# Patient Record
Sex: MALE | Race: WHITE | Employment: OTHER | URBAN - METROPOLITAN AREA
[De-identification: names, ages, dates, MRNs, and addresses within clinical notes are randomized per-mention and may not be internally consistent; named-entity substitution may affect disease eponyms.]

---

## 2017-02-02 ENCOUNTER — OFFICE VISIT (OUTPATIENT)
Dept: INTERNAL MEDICINE CLINIC | Age: 62
End: 2017-02-02

## 2017-02-02 VITALS
SYSTOLIC BLOOD PRESSURE: 140 MMHG | DIASTOLIC BLOOD PRESSURE: 82 MMHG | WEIGHT: 217 LBS | HEIGHT: 68 IN | BODY MASS INDEX: 32.89 KG/M2 | OXYGEN SATURATION: 99 % | TEMPERATURE: 98.1 F | HEART RATE: 81 BPM

## 2017-02-02 DIAGNOSIS — Z23 NEED FOR VACCINATION WITH 13-POLYVALENT PNEUMOCOCCAL CONJUGATE VACCINE: ICD-10-CM

## 2017-02-02 DIAGNOSIS — I10 ESSENTIAL HYPERTENSION: ICD-10-CM

## 2017-02-02 DIAGNOSIS — E11.9 DIABETES MELLITUS WITHOUT COMPLICATION (HCC): ICD-10-CM

## 2017-02-02 DIAGNOSIS — Z23 NEED FOR ZOSTAVAX ADMINISTRATION: ICD-10-CM

## 2017-02-02 DIAGNOSIS — Z23 NEED FOR TDAP VACCINATION: ICD-10-CM

## 2017-02-02 DIAGNOSIS — M25.512 ACUTE PAIN OF LEFT SHOULDER: ICD-10-CM

## 2017-02-02 DIAGNOSIS — F32.9 REACTIVE DEPRESSION: ICD-10-CM

## 2017-02-02 DIAGNOSIS — E78.00 HYPERCHOLESTEROLEMIA: ICD-10-CM

## 2017-02-02 DIAGNOSIS — Z00.00 WELL ADULT EXAM: Primary | ICD-10-CM

## 2017-02-02 RX ORDER — ATORVASTATIN CALCIUM 40 MG/1
TABLET, FILM COATED ORAL
Qty: 90 TAB | Refills: 1 | Status: SHIPPED | OUTPATIENT
Start: 2017-02-02 | End: 2017-02-07 | Stop reason: SDUPTHER

## 2017-02-02 NOTE — PROGRESS NOTES
HISTORY OF PRESENT ILLNESS  Bert Fontana is a 64 y.o. male. HPI  Here for a pe. He has controlled htn since adding lantus. He is on an arb for htn . He is on a statin for hld. He is doing well with depression. He needs vaccines. His c-scope is current. He is exercising at the gym but hurt his left shoulder. He is a stay at home  with wife working at [employer]. Past Medical History   Diagnosis Date    ACE-inhibitor cough     Depression     Diabetes (HCC)     HTN (hypertension)     Hypercholesterolemia     OCD (obsessive compulsive disorder)      Current Outpatient Prescriptions   Medication Sig    losartan (COZAAR) 100 mg tablet TAKE ONE TABLET BY MOUTH ONCE DAILY    glimepiride (AMARYL) 4 mg tablet TAKE ONE TABLET BY MOUTH IN THE MORNING    metFORMIN ER (GLUCOPHAGE XR) 500 mg tablet TAKE FOUR TABLETS BY MOUTH ONCE DAILY WITH DINNER    citalopram (CELEXA) 40 mg tablet TAKE ONE TABLET BY MOUTH ONCE DAILY    atorvastatin (LIPITOR) 20 mg tablet TAKE ONE TABLET BY MOUTH IN THE EVENING    insulin glargine (LANTUS SOLOSTAR) 100 unit/mL (3 mL) pen 40-60 units daily    Insulin Needles, Disposable, 31 gauge x 5/16\" ndle by SubCUTAneous route daily.  aspirin 81 mg chewable tablet Take 81 mg by mouth daily. No current facility-administered medications for this visit. Review of Systems   Constitutional: Negative for fever, malaise/fatigue and weight loss. Eyes: Negative for blurred vision. Respiratory: Negative for cough and shortness of breath. Cardiovascular: Negative for chest pain and leg swelling. Gastrointestinal: Negative for abdominal pain, blood in stool, constipation and diarrhea. Genitourinary: Negative for frequency and hematuria. Musculoskeletal: Negative for back pain and joint pain. Skin: Negative for rash. Neurological: Negative for dizziness, sensory change, focal weakness and headaches. Psychiatric/Behavioral: Negative for depression and memory loss. The patient is not nervous/anxious and does not have insomnia. not sexually active due to wife's dyspareunia    Visit Vitals    /82 (BP 1 Location: Right arm, BP Patient Position: Sitting)    Pulse 81    Temp 98.1 °F (36.7 °C)    Ht 5' 8.25\" (1.734 m)    Wt 217 lb (98.4 kg)    SpO2 99%    BMI 32.75 kg/m2       Physical Exam   Constitutional: He is oriented to person, place, and time. He appears well-developed and well-nourished. HENT:   Head: Normocephalic and atraumatic. Right Ear: External ear normal.   Left Ear: External ear normal.   Mouth/Throat: Oropharynx is clear and moist.   Eyes: Conjunctivae and EOM are normal. Pupils are equal, round, and reactive to light. Neck: Normal range of motion. Neck supple. No JVD present. No thyromegaly present. Cardiovascular: Normal rate, regular rhythm and normal heart sounds. Exam reveals no gallop and no friction rub. No murmur heard. Pulmonary/Chest: Effort normal and breath sounds normal. He has no wheezes. He has no rales. Abdominal: Soft. Bowel sounds are normal. He exhibits no mass. There is no tenderness. There is no guarding. Genitourinary: Rectum normal, prostate normal and penis normal.   Musculoskeletal: Normal range of motion. He exhibits no edema. Lymphadenopathy:     He has no cervical adenopathy. Neurological: He is alert and oriented to person, place, and time. He has normal strength and normal reflexes. No cranial nerve deficit or sensory deficit. Skin: Skin is warm and dry. No rash noted. Psychiatric: He has a normal mood and affect. His behavior is normal. Thought content normal.   Nursing note and vitals reviewed.     Lab Results   Component Value Date/Time    Hemoglobin A1c 6.9 10/21/2016 09:47 AM     Lab Results   Component Value Date/Time    Cholesterol, total 181 10/21/2016 09:47 AM    HDL Cholesterol 34 10/21/2016 09:47 AM    LDL, calculated 110 10/21/2016 09:47 AM    VLDL, calculated 37 10/21/2016 09:47 AM Triglyceride 186 10/21/2016 09:47 AM     Lab Results   Component Value Date/Time    Prostate Specific Ag 1.9 02/15/2016 10:41 AM    Prostate Specific Ag 1.9 07/21/2014 03:51 PM       ASSESSMENT and PLAN  Ray Radha was seen today for physical.    Diagnoses and all orders for this visit:    Well adult exam  -     PSA SCREENING (SCREENING) ()  -     CBC WITH AUTOMATED DIFF    Diabetes mellitus without complication (HCC)  -     HEMOGLOBIN A1C WITH EAG  -     MICROALBUMIN, UR, RAND W/ MICROALBUMIN/CREA RATIO  The current medical regimen is effective;  continue present plan and medications. Essential hypertension  -     METABOLIC PANEL, COMPREHENSIVE  The current medical regimen is effective;  continue present plan and medications. Hypercholesterolemia  -     LIPID PANEL  -   increase  atorvastatin (LIPITOR) 40 mg tablet; TAKE ONE TABLET BY MOUTH IN THE EVENING    Reactive depression  The current medical regimen is effective;  continue present plan and medications. Acute pain of left shoulder  -     REFERRAL TO ORTHOPEDICS    Need for Tdap vaccination  Rx given.     Need for vaccination with 13-polyvalent pneumococcal conjugate vaccine    Need for Zostavax administration    Reviewed plan of care with the patient who has provided input and agrees with the goals

## 2017-02-02 NOTE — MR AVS SNAPSHOT
Visit Information Date & Time Provider Department Dept. Phone Encounter #  
 2/2/2017  8:30 AM Tacos Hood MD [facility] [phone] [id number] Upcoming Health Maintenance Date Due Hepatitis C Screening 1955 EYE EXAM RETINAL OR DILATED Q1 10/18/1965 Pneumococcal 19-64 Medium Risk (1 of 1 - PPSV23) 10/18/1974 DTaP/Tdap/Td series (1 - Tdap) 10/18/1976 ZOSTER VACCINE AGE 60> 10/18/2015 FOOT EXAM Q1 2/15/2017 MICROALBUMIN Q1 2/15/2017 COLONOSCOPY 1/1/2019* HEMOGLOBIN A1C Q6M 4/21/2017 LIPID PANEL Q1 10/21/2017 *Topic was postponed. The date shown is not the original due date. Allergies as of 2/2/2017  Review Complete On: 2/2/2017 By: Harvinder Redd No Known Allergies Current Immunizations  Reviewed on 10/21/2016 Name Date Influenza Vaccine 10/21/2014  9:09 AM  
 Influenza Vaccine (Quad) 11/3/2015 Influenza Vaccine (Quad) PF 10/21/2016 Not reviewed this visit You Were Diagnosed With   
  
 Codes Comments Well adult exam    -  Primary ICD-10-CM: Z00.00 ICD-9-CM: V70.0 Diabetes mellitus without complication ([facility].)     SPZ-08-VZ: E11.9 ICD-9-CM: 250.00 Essential hypertension     ICD-10-CM: I10 
ICD-9-CM: 401.9 Hypercholesterolemia     ICD-10-CM: E78.00 ICD-9-CM: 272.0 Reactive depression     ICD-10-CM: F32.9 ICD-9-CM: 300.4 Acute pain of left shoulder     ICD-10-CM: M25.512 ICD-9-CM: 719.41 Need for Tdap vaccination     ICD-10-CM: M49 ICD-9-CM: V06.1 Need for vaccination with 13-polyvalent pneumococcal conjugate vaccine     ICD-10-CM: R94 ICD-9-CM: V03.82 Need for Zostavax administration     ICD-10-CM: Z89 ICD-9-CM: V04.89 Vitals BP Pulse Temp Height(growth percentile) Weight(growth percentile) SpO2  
 150/82 (BP 1 Location: Right arm, BP Patient Position: Sitting) 81 98.1 °F (36.7 °C) 5' 8.25\" (1.734 m) 217 lb (98.4 kg) 99% BMI Smoking Status 32.75 kg/m2 Never Smoker BMI and BSA Data Body Mass Index Body Surface Area 32.75 kg/m 2 2.18 m 2 Preferred Pharmacy Pharmacy Name Phone [facility] [address] [place] [address] [phone] Your Updated Medication List  
  
   
This list is accurate as of: 2/2/17  8:51 AM.  Always use your most recent med list.  
  
  
  
  
 aspirin 81 mg chewable tablet Take 81 mg by mouth daily. atorvastatin 40 mg tablet Commonly known as:  LIPITOR  
TAKE ONE TABLET BY MOUTH IN THE EVENING  
  
 citalopram 40 mg tablet Commonly known as:  CELEXA  
TAKE ONE TABLET BY MOUTH ONCE DAILY  
  
 glimepiride 4 mg tablet Commonly known as:  AMARYL  
TAKE ONE TABLET BY MOUTH IN THE MORNING  
  
 insulin glargine 100 unit/mL (3 mL) pen Commonly known as:  LANTUS SOLOSTAR  
40-60 units daily Insulin Needles (Disposable) 31 gauge x 5/16\" Ndle  
by SubCUTAneous route daily. losartan 100 mg tablet Commonly known as:  COZAAR  
TAKE ONE TABLET BY MOUTH ONCE DAILY  
  
 metFORMIN  mg tablet Commonly known as:  GLUCOPHAGE XR  
TAKE FOUR TABLETS BY MOUTH ONCE DAILY WITH DINNER Prescriptions Sent to Pharmacy Refills  
 atorvastatin (LIPITOR) 40 mg tablet 1 Sig: TAKE ONE TABLET BY MOUTH IN THE EVENING Class: Normal  
 Pharmacy: [address] Ph #: [phone] We Performed the Following CBC WITH AUTOMATED DIFF [66843 CPT(R)] HEMOGLOBIN A1C WITH EAG [67551 CPT(R)] LIPID PANEL [87928 CPT(R)] METABOLIC PANEL, COMPREHENSIVE [78961 CPT(R)] MICROALBUMIN, UR, RAND W/ MICROALBUMIN/CREA RATIO [id number] CPT(R)] PSA SCREENING (SCREENING) [ [facility]] REFERRAL TO ORTHOPEDICS [NES950 Custom] Comments:  
 Please evaluate patient for left shoulder. Referral Information Referral ID Referred By Referred To [id number] [place], [address] [address] Phone: [phone] Fax: [phone] Visits Status Start Date End Date 1 New Request 2/2/17 2/2/18 If your referral has a status of pending review or denied, additional information will be sent to support the outcome of this decision. Introducing [facility]! Dear Arabella Sesay: Thank you for requesting a [employer] account. Our records indicate that you already have an active [employer] account. You can access your account anytime at [email or web address] Did you know that you can access your hospital and ER discharge instructions at any time in [employer]? You can also review all of your test results from your hospital stay or ER visit. Additional Information If you have questions, please visit the Frequently Asked Questions section of the [employer] website at [email or web address] Remember, [employer] is NOT to be used for urgent needs. For medical emergencies, dial 911. Now available from your iPhone and Android! Please provide this summary of care documentation to your next provider. Your primary care clinician is listed as [id number] [address] [place] [address] [address]. If you have any questions after today's visit, please call [phone].

## 2017-02-04 LAB
ALBUMIN SERPL-MCNC: 4.5 G/DL (ref 3.6–4.8)
ALBUMIN/CREAT UR: 129.3 MG/G CREAT (ref 0–30)
ALBUMIN/GLOB SERPL: 1.7 {RATIO} (ref 1.1–2.5)
ALP SERPL-CCNC: 82 IU/L (ref 39–117)
ALT SERPL-CCNC: 19 IU/L (ref 0–44)
AST SERPL-CCNC: 14 IU/L (ref 0–40)
BASOPHILS # BLD AUTO: 0 X10E3/UL (ref 0–0.2)
BASOPHILS NFR BLD AUTO: 0 %
BILIRUB SERPL-MCNC: 0.5 MG/DL (ref 0–1.2)
BUN SERPL-MCNC: 9 MG/DL (ref 8–27)
BUN/CREAT SERPL: 13 (ref 10–22)
CALCIUM SERPL-MCNC: 8.8 MG/DL (ref 8.6–10.2)
CHLORIDE SERPL-SCNC: 98 MMOL/L (ref 96–106)
CHOLEST SERPL-MCNC: 141 MG/DL (ref 100–199)
CO2 SERPL-SCNC: 27 MMOL/L (ref 18–29)
CREAT SERPL-MCNC: 0.69 MG/DL (ref 0.76–1.27)
CREAT UR-MCNC: 101 MG/DL
EOSINOPHIL # BLD AUTO: 0.2 X10E3/UL (ref 0–0.4)
EOSINOPHIL NFR BLD AUTO: 1 %
ERYTHROCYTE [DISTWIDTH] IN BLOOD BY AUTOMATED COUNT: 14.4 % (ref 12.3–15.4)
EST. AVERAGE GLUCOSE BLD GHB EST-MCNC: 169 MG/DL
GLOBULIN SER CALC-MCNC: 2.6 G/DL (ref 1.5–4.5)
GLUCOSE SERPL-MCNC: 155 MG/DL (ref 65–99)
HBA1C MFR BLD: 7.5 % (ref 4.8–5.6)
HCT VFR BLD AUTO: 41.1 % (ref 37.5–51)
HDLC SERPL-MCNC: 32 MG/DL
HGB BLD-MCNC: 13.8 G/DL (ref 12.6–17.7)
IMM GRANULOCYTES # BLD: 0 X10E3/UL (ref 0–0.1)
IMM GRANULOCYTES NFR BLD: 0 %
INTERPRETATION, 910389: NORMAL
LDLC SERPL CALC-MCNC: 49 MG/DL (ref 0–99)
LYMPHOCYTES # BLD AUTO: 3 X10E3/UL (ref 0.7–3.1)
LYMPHOCYTES NFR BLD AUTO: 21 %
Lab: NORMAL
MCH RBC QN AUTO: 26.5 PG (ref 26.6–33)
MCHC RBC AUTO-ENTMCNC: 33.6 G/DL (ref 31.5–35.7)
MCV RBC AUTO: 79 FL (ref 79–97)
MICROALBUMIN UR-MCNC: 130.6 UG/ML
MONOCYTES # BLD AUTO: 1.2 X10E3/UL (ref 0.1–0.9)
MONOCYTES NFR BLD AUTO: 8 %
NEUTROPHILS # BLD AUTO: 10.3 X10E3/UL (ref 1.4–7)
NEUTROPHILS NFR BLD AUTO: 70 %
PLATELET # BLD AUTO: 216 X10E3/UL (ref 150–379)
POTASSIUM SERPL-SCNC: 4.3 MMOL/L (ref 3.5–5.2)
PROT SERPL-MCNC: 7.1 G/DL (ref 6–8.5)
PSA SERPL-MCNC: 1.7 NG/ML (ref 0–4)
RBC # BLD AUTO: 5.21 X10E6/UL (ref 4.14–5.8)
SODIUM SERPL-SCNC: 140 MMOL/L (ref 134–144)
TRIGL SERPL-MCNC: 302 MG/DL (ref 0–149)
VLDLC SERPL CALC-MCNC: 60 MG/DL (ref 5–40)
WBC # BLD AUTO: 14.8 X10E3/UL (ref 3.4–10.8)

## 2017-02-07 DIAGNOSIS — E78.00 HYPERCHOLESTEROLEMIA: ICD-10-CM

## 2017-02-07 RX ORDER — ATORVASTATIN CALCIUM 40 MG/1
TABLET, FILM COATED ORAL
Qty: 90 TAB | Refills: 1 | Status: SHIPPED | OUTPATIENT
Start: 2017-02-07 | End: 2017-09-16 | Stop reason: SDUPTHER

## 2017-05-31 ENCOUNTER — OFFICE VISIT (OUTPATIENT)
Dept: INTERNAL MEDICINE CLINIC | Age: 62
End: 2017-05-31

## 2017-05-31 VITALS
RESPIRATION RATE: 18 BRPM | HEART RATE: 67 BPM | OXYGEN SATURATION: 96 % | SYSTOLIC BLOOD PRESSURE: 140 MMHG | HEIGHT: 68 IN | DIASTOLIC BLOOD PRESSURE: 80 MMHG | BODY MASS INDEX: 32.52 KG/M2 | WEIGHT: 214.6 LBS | TEMPERATURE: 97.9 F

## 2017-05-31 DIAGNOSIS — E11.9 DIABETES MELLITUS WITHOUT COMPLICATION (HCC): Primary | ICD-10-CM

## 2017-05-31 DIAGNOSIS — I10 ESSENTIAL HYPERTENSION: ICD-10-CM

## 2017-05-31 DIAGNOSIS — E78.00 HYPERCHOLESTEROLEMIA: ICD-10-CM

## 2017-05-31 DIAGNOSIS — J01.00 SUBACUTE MAXILLARY SINUSITIS: ICD-10-CM

## 2017-05-31 RX ORDER — CEFUROXIME AXETIL 500 MG/1
500 TABLET ORAL 2 TIMES DAILY
Qty: 20 TAB | Refills: 0 | Status: SHIPPED | OUTPATIENT
Start: 2017-05-31 | End: 2017-08-31 | Stop reason: ALTCHOICE

## 2017-05-31 NOTE — PROGRESS NOTES
HISTORY OF PRESENT ILLNESS  Robert Wood is a 64 y.o. male. HPI  Here for dm. He is not counting carbs and readings are \"all over the place\". He is using lantus with oral agents. He has controlled htn and is on a statin for hld. He has had a sinus infection for one month. Past Medical History:   Diagnosis Date    ACE-inhibitor cough     Depression     Diabetes (HCC)     HTN (hypertension)     Hypercholesterolemia     OCD (obsessive compulsive disorder)      Current Outpatient Prescriptions   Medication Sig    cefUROXime (CEFTIN) 500 mg tablet Take 1 Tab by mouth two (2) times a day.  losartan (COZAAR) 100 mg tablet TAKE ONE TABLET BY MOUTH ONCE DAILY    atorvastatin (LIPITOR) 40 mg tablet TAKE ONE TABLET BY MOUTH IN THE EVENING    citalopram (CELEXA) 40 mg tablet TAKE ONE TABLET BY MOUTH ONCE DAILY    glimepiride (AMARYL) 4 mg tablet TAKE ONE TABLET BY MOUTH IN THE MORNING    metFORMIN ER (GLUCOPHAGE XR) 500 mg tablet TAKE FOUR TABLETS BY MOUTH ONCE DAILY WITH DINNER    insulin glargine (LANTUS SOLOSTAR) 100 unit/mL (3 mL) pen 40-60 units daily    Insulin Needles, Disposable, 31 gauge x 5/16\" ndle by SubCUTAneous route daily.  aspirin 81 mg chewable tablet Take 81 mg by mouth daily. No current facility-administered medications for this visit. Review of Systems   All other systems reviewed and are negative. Visit Vitals    /80 (BP 1 Location: Left arm, BP Patient Position: At rest)    Pulse 67    Temp 97.9 °F (36.6 °C)    Resp 18    Ht 5' 8\" (1.727 m)    Wt 214 lb 9.6 oz (97.3 kg)    SpO2 96%    BMI 32.63 kg/m2       Physical Exam   Constitutional: He appears well-developed and well-nourished. Cardiovascular: Normal rate, regular rhythm and normal heart sounds. Pulmonary/Chest: Effort normal and breath sounds normal. No respiratory distress. He has no wheezes. He has no rales. Nursing note and vitals reviewed.     Lab Results   Component Value Date/Time Hemoglobin A1c 7.5 02/03/2017 09:19 AM       ASSESSMENT and PLAN  Emily Ortega was seen today for hypertension, diabetes and cholesterol problem. Diagnoses and all orders for this visit:    Diabetes mellitus without complication ([facility]  -     HEMOGLOBIN A1C WITH EAG  -     CBC WITH AUTOMATED DIFF  Need to carb count sticking to 60 grams per meal.  May need to increase insulin if FBG elevated. Essential hypertension  -     METABOLIC PANEL, COMPREHENSIVE  The current medical regimen is effective;  continue present plan and medications. Hypercholesterolemia  -     LIPID PANEL  The current medical regimen is effective;  continue present plan and medications. Subacute maxillary sinusitis  -     cefUROXime (CEFTIN) 500 mg tablet; Take 1 Tab by mouth two (2) times a day.       Reviewed plan of care with the patient who has provided input and agrees with the goals

## 2017-05-31 NOTE — PROGRESS NOTES
1. Have you been to the ER, urgent care clinic since your last visit? Hospitalized since your last visit?no    2. Have you seen or consulted any other health care providers outside of the [address] [place] [address] since your last visit? Include any pap smears or colon screening.  No    Chief Complaint   Patient presents with    Hypertension     3 months follow up    Diabetes     3months follow up    Cholesterol Problem     3 months follow up     Fasting

## 2017-06-01 LAB
ALBUMIN SERPL-MCNC: 4.4 G/DL (ref 3.6–4.8)
ALBUMIN/GLOB SERPL: 1.8 {RATIO} (ref 1.2–2.2)
ALP SERPL-CCNC: 82 IU/L (ref 39–117)
ALT SERPL-CCNC: 17 IU/L (ref 0–44)
AST SERPL-CCNC: 13 IU/L (ref 0–40)
BASOPHILS # BLD AUTO: 0.1 X10E3/UL (ref 0–0.2)
BASOPHILS NFR BLD AUTO: 0 %
BILIRUB SERPL-MCNC: 0.7 MG/DL (ref 0–1.2)
BUN SERPL-MCNC: 11 MG/DL (ref 8–27)
BUN/CREAT SERPL: 15 (ref 10–24)
CALCIUM SERPL-MCNC: 8.9 MG/DL (ref 8.6–10.2)
CHLORIDE SERPL-SCNC: 98 MMOL/L (ref 96–106)
CHOLEST SERPL-MCNC: 143 MG/DL (ref 100–199)
CO2 SERPL-SCNC: 22 MMOL/L (ref 18–29)
CREAT SERPL-MCNC: 0.72 MG/DL (ref 0.76–1.27)
EOSINOPHIL # BLD AUTO: 0.2 X10E3/UL (ref 0–0.4)
EOSINOPHIL NFR BLD AUTO: 2 %
ERYTHROCYTE [DISTWIDTH] IN BLOOD BY AUTOMATED COUNT: 13.4 % (ref 12.3–15.4)
EST. AVERAGE GLUCOSE BLD GHB EST-MCNC: 243 MG/DL
GLOBULIN SER CALC-MCNC: 2.5 G/DL (ref 1.5–4.5)
GLUCOSE SERPL-MCNC: 219 MG/DL (ref 65–99)
HBA1C MFR BLD: 10.1 % (ref 4.8–5.6)
HCT VFR BLD AUTO: 41.3 % (ref 37.5–51)
HDLC SERPL-MCNC: 32 MG/DL
HGB BLD-MCNC: 13.6 G/DL (ref 12.6–17.7)
IMM GRANULOCYTES # BLD: 0 X10E3/UL (ref 0–0.1)
IMM GRANULOCYTES NFR BLD: 0 %
INTERPRETATION, 910389: NORMAL
LDLC SERPL CALC-MCNC: 68 MG/DL (ref 0–99)
LYMPHOCYTES # BLD AUTO: 3.3 X10E3/UL (ref 0.7–3.1)
LYMPHOCYTES NFR BLD AUTO: 29 %
Lab: NORMAL
MCH RBC QN AUTO: 27 PG (ref 26.6–33)
MCHC RBC AUTO-ENTMCNC: 32.9 G/DL (ref 31.5–35.7)
MCV RBC AUTO: 82 FL (ref 79–97)
MONOCYTES # BLD AUTO: 0.9 X10E3/UL (ref 0.1–0.9)
MONOCYTES NFR BLD AUTO: 8 %
NEUTROPHILS # BLD AUTO: 7 X10E3/UL (ref 1.4–7)
NEUTROPHILS NFR BLD AUTO: 61 %
PLATELET # BLD AUTO: 215 X10E3/UL (ref 150–379)
POTASSIUM SERPL-SCNC: 4.5 MMOL/L (ref 3.5–5.2)
PROT SERPL-MCNC: 6.9 G/DL (ref 6–8.5)
RBC # BLD AUTO: 5.03 X10E6/UL (ref 4.14–5.8)
SODIUM SERPL-SCNC: 140 MMOL/L (ref 134–144)
TRIGL SERPL-MCNC: 214 MG/DL (ref 0–149)
VLDLC SERPL CALC-MCNC: 43 MG/DL (ref 5–40)
WBC # BLD AUTO: 11.5 X10E3/UL (ref 3.4–10.8)

## 2017-08-16 RX ORDER — CITALOPRAM 40 MG/1
TABLET, FILM COATED ORAL
Qty: 90 TAB | Refills: 1 | Status: SHIPPED | OUTPATIENT
Start: 2017-08-16 | End: 2017-09-16 | Stop reason: SDUPTHER

## 2017-08-16 RX ORDER — GLIMEPIRIDE 4 MG/1
TABLET ORAL
Qty: 90 TAB | Refills: 1 | Status: SHIPPED | OUTPATIENT
Start: 2017-08-16 | End: 2017-09-16 | Stop reason: SDUPTHER

## 2017-08-31 ENCOUNTER — OFFICE VISIT (OUTPATIENT)
Dept: INTERNAL MEDICINE CLINIC | Age: 62
End: 2017-08-31

## 2017-08-31 VITALS
WEIGHT: 211 LBS | SYSTOLIC BLOOD PRESSURE: 146 MMHG | HEIGHT: 68 IN | BODY MASS INDEX: 31.98 KG/M2 | OXYGEN SATURATION: 98 % | TEMPERATURE: 98.2 F | RESPIRATION RATE: 18 BRPM | HEART RATE: 66 BPM | DIASTOLIC BLOOD PRESSURE: 83 MMHG

## 2017-08-31 DIAGNOSIS — E78.00 HYPERCHOLESTEROLEMIA: ICD-10-CM

## 2017-08-31 DIAGNOSIS — E11.9 DIABETES MELLITUS WITHOUT COMPLICATION (HCC): Primary | ICD-10-CM

## 2017-08-31 DIAGNOSIS — E11.9 DIABETES MELLITUS WITHOUT COMPLICATION (HCC): ICD-10-CM

## 2017-08-31 DIAGNOSIS — I10 ESSENTIAL HYPERTENSION: ICD-10-CM

## 2017-08-31 RX ORDER — INSULIN GLARGINE 100 [IU]/ML
INJECTION, SOLUTION SUBCUTANEOUS
Qty: 15 PEN | Refills: 5 | Status: SHIPPED | OUTPATIENT
Start: 2017-08-31 | End: 2017-09-16 | Stop reason: SDUPTHER

## 2017-08-31 NOTE — PROGRESS NOTES
HISTORY OF PRESENT ILLNESS  Joaquín Motley is a 64 y.o. male. HPI  Here for DM. His recent control was poor so he is trying harder for a month and cut carbs. He has lost 3 lbs. He is using three metformin and 60 units of lantus now. He sees higher PPG but FBG is 150. He has controlled HTN on an ARB. He is on a statin for HLD. Past Medical History:   Diagnosis Date    ACE-inhibitor cough     Depression     Diabetes (HCC)     HTN (hypertension)     Hypercholesterolemia     OCD (obsessive compulsive disorder)      Current Outpatient Prescriptions   Medication Sig    LANTUS SOLOSTAR 100 unit/mL (3 mL) inpn INJECT 40-60 UNITS SUBCUTANEOUSLY DAILY    citalopram (CELEXA) 40 mg tablet TAKE ONE TABLET BY MOUTH ONCE DAILY    losartan (COZAAR) 100 mg tablet TAKE ONE TABLET BY MOUTH ONCE DAILY    atorvastatin (LIPITOR) 40 mg tablet TAKE ONE TABLET BY MOUTH IN THE EVENING    metFORMIN ER (GLUCOPHAGE XR) 500 mg tablet TAKE FOUR TABLETS BY MOUTH ONCE DAILY WITH DINNER    Insulin Needles, Disposable, 31 gauge x 5/16\" ndle by SubCUTAneous route daily.  aspirin 81 mg chewable tablet Take 81 mg by mouth daily.  glimepiride (AMARYL) 4 mg tablet TAKE ONE TABLET BY MOUTH IN THE MORNING     No current facility-administered medications for this visit. Review of Systems   All other systems reviewed and are negative. Visit Vitals    /83 (BP 1 Location: Left arm, BP Patient Position: At rest)    Pulse 66    Temp 98.2 °F (36.8 °C) (Oral)    Resp 18    Ht 5' 8\" (1.727 m)    Wt 211 lb (95.7 kg)    SpO2 98%    BMI 32.08 kg/m2       Physical Exam   Constitutional: He appears well-developed and well-nourished. Cardiovascular: Normal rate, regular rhythm and normal heart sounds. Pulmonary/Chest: Effort normal and breath sounds normal. No respiratory distress. He has no wheezes. He has no rales. Musculoskeletal:   Foot exam - bilateral normal; no swelling, tenderness or skin or vascular lesions. Color and temperature is normal. Sensation is intact. Peripheral pulses are palpable. Toenails are normal.     Nursing note and vitals reviewed. Lab Results   Component Value Date/Time    Hemoglobin A1c 10.1 05/31/2017 09:57 AM     Lab Results   Component Value Date/Time    Cholesterol, total 143 05/31/2017 09:57 AM    HDL Cholesterol 32 05/31/2017 09:57 AM    LDL, calculated 68 05/31/2017 09:57 AM    VLDL, calculated 43 05/31/2017 09:57 AM    Triglyceride 214 05/31/2017 09:57 AM       ASSESSMENT and PLAN  Diagnoses and all orders for this visit:    1. Diabetes mellitus without complication (HCC)  -      DIABETES FOOT EXAM  -     REFERRAL TO OPHTHALMOLOGY  -     HEMOGLOBIN A1C WITH EAG  Monitor PPG and may need humalog PRN. He wants to wait. 2. Essential hypertension  -     METABOLIC PANEL, COMPREHENSIVE   The current medical regimen is effective;  continue present plan and medications. 3. Hypercholesterolemia  -     LIPID PANEL  The current medical regimen is effective;  continue present plan and medications.     Reviewed plan of care with the patient who has provided input and agrees with the goals

## 2017-08-31 NOTE — MR AVS SNAPSHOT
Visit Information Date & Time Provider Department Dept. Phone Encounter #  
 8/31/2017  9:15 AM Jazzmine Crawford MD [facility] [phone] [id number] Upcoming Health Maintenance Date Due Hepatitis C Screening 1955 EYE EXAM RETINAL OR DILATED Q1 10/18/1965 Pneumococcal 19-64 Medium Risk (1 of 1 - PPSV23) 10/18/1974 DTaP/Tdap/Td series (1 - Tdap) 10/18/1976 ZOSTER VACCINE AGE 60> 8/18/2015 FOOT EXAM Q1 2/15/2017 INFLUENZA AGE 9 TO ADULT 8/1/2017 COLONOSCOPY 1/1/2019* HEMOGLOBIN A1C Q6M 11/30/2017 MICROALBUMIN Q1 2/3/2018 LIPID PANEL Q1 5/31/2018 *Topic was postponed. The date shown is not the original due date. Allergies as of 8/31/2017  Review Complete On: 8/31/2017 By: Pamela Buch[address] No Known Allergies Current Immunizations  Reviewed on 10/21/2016 Name Date Influenza Vaccine 10/21/2014  9:09 AM  
 Influenza Vaccine (Quad) 11/3/2015 Influenza Vaccine (Quad) PF 10/21/2016 Not reviewed this visit You Were Diagnosed With   
  
 Codes Comments Diabetes [facility]    -  Primary ICD-10-CM: E11.9 ICD-9-CM: 250.00 Essential hypertension     ICD-10-CM: I10 
ICD-9-CM: 401.9 Hypercholesterolemia     ICD-10-CM: E78.00 ICD-9-CM: 272.0 Vitals BP Pulse Temp Resp Height(growth percentile) Weight(growth percentile) 146/83 (BP 1 Location: Left arm, BP Patient Position: At rest) 66 98.2 °F (36.8 °C) (Oral) 18 5' 8\" (1.727 m) 211 lb (95.7 kg) SpO2 BMI Smoking Status 98% 32.08 kg/m2 Never Smoker BMI and BSA Data Body Mass Index Body Surface Area 32.08 kg/m 2 2.14 m 2 Preferred Pharmacy Pharmacy Name Phone [facility] [address] [place] [address] [phone] Your Updated Medication List  
  
   
This list is accurate as of: 8/31/17  9:34 AM.  Always use your most recent med list.  
  
  
  
 aspirin 81 mg chewable tablet Take 81 mg by mouth daily. atorvastatin 40 mg tablet Commonly known as:  LIPITOR  
TAKE ONE TABLET BY MOUTH IN THE EVENING  
  
 citalopram 40 mg tablet Commonly known as:  CELEXA  
TAKE ONE TABLET BY MOUTH ONCE DAILY  
  
 glimepiride 4 mg tablet Commonly known as:  AMARYL  
TAKE ONE TABLET BY MOUTH IN THE MORNING Insulin Needles (Disposable) 31 gauge x 5/16\" Ndle  
by SubCUTAneous route daily. LANTUS SOLOSTAR 100 unit/mL (3 mL) Inpn Generic drug:  insulin glargine INJECT 40-60 UNITS SUBCUTANEOUSLY DAILY losartan 100 mg tablet Commonly known as:  COZAAR  
TAKE ONE TABLET BY MOUTH ONCE DAILY  
  
 metFORMIN  mg tablet Commonly known as:  GLUCOPHAGE XR  
TAKE FOUR TABLETS BY MOUTH ONCE DAILY WITH DINNER We Performed the Following HEMOGLOBIN A1C WITH EAG [46072 CPT(R)]  DIABETES FOOT EXAM [HM7 Custom] LIPID PANEL [79637 CPT(R)] METABOLIC PANEL, COMPREHENSIVE [57270 CPT(R)] REFERRAL TO OPHTHALMOLOGY [REF57 Custom] Comments:  
 Please evaluate patient for DM. Referral Information Referral ID Referred By Referred To  
  
 [id number] Yue Pa Not Available Visits Status Start Date End Date 1 New Request 8/31/17 8/31/18 If your referral has a status of pending review or denied, additional information will be sent to support the outcome of this decision. Introducing [facility]! Dear Mahsa Yousif: Thank you for requesting a [employer] account. Our records indicate that you already have an active [employer] account. You can access your account anytime at [email or web address] Did you know that you can access your hospital and ER discharge instructions at any time in [employer]? You can also review all of your test results from your hospital stay or ER visit. Additional Information If you have questions, please visit the Frequently Asked Questions section of the [employer] website at [email or web address] Remember, [employer] is NOT to be used for urgent needs. For medical emergencies, dial 911. Now available from your iPhone and Android! Please provide this summary of care documentation to your next provider. Your primary care clinician is listed as [phone] [place] [address] [address]. If you have any questions after today's visit, please call [phone].

## 2017-08-31 NOTE — PROGRESS NOTES
1. Have you been to the ER, urgent care clinic since your last visit? Hospitalized since your last visit?no    2. Have you seen or consulted any other health care providers outside of the [address] [place] [address] since your last visit? Include any pap smears or colon screening.  No    Chief Complaint   Patient presents with    Cholesterol Problem     3 months follow up    Diabetes     3 months follow up    Hypertension     3 months follow up     Fasting

## 2017-09-01 LAB
ALBUMIN SERPL-MCNC: 4.4 G/DL (ref 3.6–4.8)
ALBUMIN/GLOB SERPL: 1.7 {RATIO} (ref 1.2–2.2)
ALP SERPL-CCNC: 79 IU/L (ref 39–117)
ALT SERPL-CCNC: 23 IU/L (ref 0–44)
AST SERPL-CCNC: 17 IU/L (ref 0–40)
BILIRUB SERPL-MCNC: 0.5 MG/DL (ref 0–1.2)
BUN SERPL-MCNC: 13 MG/DL (ref 8–27)
BUN/CREAT SERPL: 18 (ref 10–24)
CALCIUM SERPL-MCNC: 9.1 MG/DL (ref 8.6–10.2)
CHLORIDE SERPL-SCNC: 100 MMOL/L (ref 96–106)
CHOLEST SERPL-MCNC: 106 MG/DL (ref 100–199)
CO2 SERPL-SCNC: 21 MMOL/L (ref 18–29)
CREAT SERPL-MCNC: 0.73 MG/DL (ref 0.76–1.27)
EST. AVERAGE GLUCOSE BLD GHB EST-MCNC: 235 MG/DL
GLOBULIN SER CALC-MCNC: 2.6 G/DL (ref 1.5–4.5)
GLUCOSE SERPL-MCNC: 114 MG/DL (ref 65–99)
HBA1C MFR BLD: 9.8 % (ref 4.8–5.6)
HDLC SERPL-MCNC: 31 MG/DL
INTERPRETATION, 910389: NORMAL
LDLC SERPL CALC-MCNC: 37 MG/DL (ref 0–99)
Lab: NORMAL
POTASSIUM SERPL-SCNC: 4.2 MMOL/L (ref 3.5–5.2)
PROT SERPL-MCNC: 7 G/DL (ref 6–8.5)
SODIUM SERPL-SCNC: 139 MMOL/L (ref 134–144)
TRIGL SERPL-MCNC: 189 MG/DL (ref 0–149)
VLDLC SERPL CALC-MCNC: 38 MG/DL (ref 5–40)

## 2017-09-16 DIAGNOSIS — E11.9 DIABETES MELLITUS WITHOUT COMPLICATION (HCC): ICD-10-CM

## 2017-09-16 DIAGNOSIS — E78.00 HYPERCHOLESTEROLEMIA: ICD-10-CM

## 2017-09-16 DIAGNOSIS — I10 ESSENTIAL HYPERTENSION: ICD-10-CM

## 2017-09-17 RX ORDER — CITALOPRAM 40 MG/1
TABLET, FILM COATED ORAL
Qty: 90 TAB | Refills: 1 | Status: SHIPPED | OUTPATIENT
Start: 2017-09-17 | End: 2018-01-19 | Stop reason: SDUPTHER

## 2017-09-17 RX ORDER — METFORMIN HYDROCHLORIDE 500 MG/1
TABLET, EXTENDED RELEASE ORAL
Qty: 360 TAB | Refills: 1 | Status: SHIPPED | OUTPATIENT
Start: 2017-09-17 | End: 2018-01-31 | Stop reason: SDUPTHER

## 2017-09-17 RX ORDER — LOSARTAN POTASSIUM 100 MG/1
TABLET ORAL
Qty: 90 TAB | Refills: 1 | Status: SHIPPED | OUTPATIENT
Start: 2017-09-17 | End: 2018-01-19 | Stop reason: SDUPTHER

## 2017-09-17 RX ORDER — GLIMEPIRIDE 4 MG/1
TABLET ORAL
Qty: 90 TAB | Refills: 1 | Status: SHIPPED | OUTPATIENT
Start: 2017-09-17 | End: 2018-01-19 | Stop reason: SDUPTHER

## 2017-09-17 RX ORDER — ATORVASTATIN CALCIUM 40 MG/1
TABLET, FILM COATED ORAL
Qty: 90 TAB | Refills: 1 | Status: SHIPPED | OUTPATIENT
Start: 2017-09-17 | End: 2018-01-19 | Stop reason: SDUPTHER

## 2017-09-17 RX ORDER — INSULIN GLARGINE 100 [IU]/ML
INJECTION, SOLUTION SUBCUTANEOUS
Qty: 20 PEN | Refills: 2 | Status: SHIPPED | OUTPATIENT
Start: 2017-09-17 | End: 2018-05-14 | Stop reason: ALTCHOICE

## 2017-11-30 ENCOUNTER — OFFICE VISIT (OUTPATIENT)
Dept: INTERNAL MEDICINE CLINIC | Age: 62
End: 2017-11-30

## 2017-11-30 VITALS
DIASTOLIC BLOOD PRESSURE: 78 MMHG | RESPIRATION RATE: 16 BRPM | OXYGEN SATURATION: 99 % | HEART RATE: 64 BPM | WEIGHT: 203.4 LBS | SYSTOLIC BLOOD PRESSURE: 138 MMHG | TEMPERATURE: 97.9 F | HEIGHT: 68 IN | BODY MASS INDEX: 30.83 KG/M2

## 2017-11-30 DIAGNOSIS — I10 ESSENTIAL HYPERTENSION: ICD-10-CM

## 2017-11-30 DIAGNOSIS — E11.9 DIABETES MELLITUS WITHOUT COMPLICATION (HCC): Primary | ICD-10-CM

## 2017-11-30 DIAGNOSIS — E78.00 HYPERCHOLESTEROLEMIA: ICD-10-CM

## 2017-11-30 DIAGNOSIS — Z23 ENCOUNTER FOR IMMUNIZATION: ICD-10-CM

## 2017-11-30 NOTE — PROGRESS NOTES
HISTORY OF PRESENT ILLNESS  Zenon Bryson is a 58 y.o. male. HPI  Here for DM. His control has been poor. His diet is now improved and he has lost 10 lbs. He has cut lantus out most days and has cut metformin to two daily. He is seeing FBG of 120 and PPG of 110. He feels well. His HTN is controlled on an ARB . He is on a statin for HLD. He needs a flu shot. Past Medical History:   Diagnosis Date    ACE-inhibitor cough     Depression     Diabetes (HCC)     HTN (hypertension)     Hypercholesterolemia     OCD (obsessive compulsive disorder)      Current Outpatient Prescriptions   Medication Sig    losartan (COZAAR) 100 mg tablet TAKE ONE TABLET BY MOUTH ONCE DAILY    metFORMIN ER (GLUCOPHAGE XR) 500 mg tablet TAKE FOUR TABLETS BY MOUTH ONCE DAILY WITH DINNER    citalopram (CELEXA) 40 mg tablet TAKE ONE TABLET BY MOUTH ONCE DAILY    atorvastatin (LIPITOR) 40 mg tablet TAKE ONE TABLET BY MOUTH IN THE EVENING    glimepiride (AMARYL) 4 mg tablet TAKE ONE TABLET BY MOUTH IN THE MORNING    Insulin Needles, Disposable, 31 gauge x 5/16\" ndle by SubCUTAneous route daily.  aspirin 81 mg chewable tablet Take 81 mg by mouth daily.  insulin glargine (LANTUS SOLOSTAR) 100 unit/mL (3 mL) inpn INJECT 40-60 UNITS SUBCUTANEOUSLY DAILY     No current facility-administered medications for this visit. Review of Systems   All other systems reviewed and are negative. Physical Exam   Constitutional: He appears well-developed and well-nourished. Cardiovascular: Normal rate, regular rhythm and normal heart sounds. Pulmonary/Chest: Effort normal and breath sounds normal. No respiratory distress. He has no wheezes. He has no rales. Nursing note and vitals reviewed.     Lab Results   Component Value Date/Time    Hemoglobin A1c 9.8 08/31/2017 09:42 AM     Lab Results   Component Value Date/Time    Cholesterol, total 106 08/31/2017 09:42 AM    HDL Cholesterol 31 08/31/2017 09:42 AM    LDL, calculated 37 08/31/2017 09:42 AM    VLDL, calculated 38 08/31/2017 09:42 AM    Triglyceride 189 08/31/2017 09:42 AM       ASSESSMENT and PLAN  Diagnoses and all orders for this visit:    1. Diabetes mellitus without complication ([facility]  -     HEMOGLOBIN A1C WITH EAG  Improved by home readings. Suggest four metformin daily and can reduce amaryl to 1/2 if hypoglycemia develops. 2. Essential hypertension  -     METABOLIC PANEL, COMPREHENSIVE  The current medical regimen is effective;  continue present plan and medications. 3. Hypercholesterolemia  -     LIPID PANEL  The current medical regimen is effective;  continue present plan and medications.     4. Encounter for immunization    Reviewed plan of care with the patient who has provided input and agrees with the goals

## 2017-11-30 NOTE — PROGRESS NOTES
1. Have you been to the ER, urgent care clinic since your last visit? Hospitalized since your last visit? No    2. Have you seen or consulted any other health care providers outside of the [address] [place] [address] since your last visit? Include any pap smears or colon screening.  No   Chief Complaint   Patient presents with    Hypertension     3 months follow up    Diabetes     3 months follow up    Cholesterol Problem     3 months follow up     Fasting

## 2017-11-30 NOTE — MR AVS SNAPSHOT
Visit Information Date & Time Provider Department Dept. Phone Encounter #  
 11/30/2017  8:15 AM Milla Coker MD [facility] [phone] [id number] Upcoming Health Maintenance Date Due Hepatitis C Screening 1955 EYE EXAM RETINAL OR DILATED Q1 10/18/1965 Pneumococcal 19-64 Medium Risk (1 of 1 - PPSV23) 10/18/1974 DTaP/Tdap/Td series (1 - Tdap) 10/18/1976 ZOSTER VACCINE AGE 60> 8/18/2015 Influenza Age 5 to Adult 8/1/2017 COLONOSCOPY 1/1/2019* MICROALBUMIN Q1 2/3/2018 HEMOGLOBIN A1C Q6M 2/28/2018 FOOT EXAM Q1 8/31/2018 LIPID PANEL Q1 8/31/2018 *Topic was postponed. The date shown is not the original due date. Allergies as of 11/30/2017  Review Complete On: 11/30/2017 By: Prosper Brady No Known Allergies Current Immunizations  Reviewed on 10/21/2016 Name Date Influenza Vaccine 10/21/2014  9:09 AM  
 Influenza Vaccine (Quad) 11/3/2015 Influenza Vaccine (Quad) PF 10/21/2016 Not reviewed this visit You Were Diagnosed With   
  
 Codes Comments Diabetes mellitus without complication ([facility]    -  Primary ICD-10-CM: E11.9 ICD-9-CM: 250.00 Essential hypertension     ICD-10-CM: I10 
ICD-9-CM: 401.9 Hypercholesterolemia     ICD-10-CM: E78.00 ICD-9-CM: 272.0 Encounter for immunization     ICD-10-CM: D57 ICD-9-CM: V03.89 Vitals BP Pulse Temp Resp Height(growth percentile) Weight(growth percentile) 138/78 (BP 1 Location: Left arm, BP Patient Position: At rest) 64 97.9 °F (36.6 °C) (Oral) 16 5' 8\" (1.727 m) 203 lb 6.4 oz (92.3 kg) SpO2 BMI Smoking Status 99% 30.93 kg/m2 Never Smoker Vitals History BMI and BSA Data Body Mass Index Body Surface Area 30.93 kg/m 2 2.1 m 2 Preferred Pharmacy Pharmacy Name Phone [address] [facility] [phone] Your Updated Medication List  
  
 This list is accurate as of: 11/30/17  8:23 AM.  Always use your most recent med list.  
  
  
  
  
 aspirin 81 mg chewable tablet Take 81 mg by mouth daily. atorvastatin 40 mg tablet Commonly known as:  LIPITOR  
TAKE ONE TABLET BY MOUTH IN THE EVENING  
  
 citalopram 40 mg tablet Commonly known as:  CELEXA  
TAKE ONE TABLET BY MOUTH ONCE DAILY  
  
 glimepiride 4 mg tablet Commonly known as:  AMARYL  
TAKE ONE TABLET BY MOUTH IN THE MORNING  
  
 insulin glargine 100 unit/mL (3 mL) Inpn Commonly known as:  LANTUS SOLOSTAR INJECT 40-60 UNITS SUBCUTANEOUSLY DAILY Insulin Needles (Disposable) 31 gauge x 5/16\" Ndle  
by SubCUTAneous route daily. losartan 100 mg tablet Commonly known as:  COZAAR  
TAKE ONE TABLET BY MOUTH ONCE DAILY  
  
 metFORMIN  mg tablet Commonly known as:  GLUCOPHAGE XR  
TAKE FOUR TABLETS BY MOUTH ONCE DAILY WITH DINNER We Performed the Following HEMOGLOBIN A1C WITH EAG [68029 CPT(R)] LIPID PANEL [82952 CPT(R)] METABOLIC PANEL, COMPREHENSIVE [50688 CPT(R)] Introducing [facility] [place] [facility] SERVICES! Dear Gisele Julio: Thank you for requesting a [employer] account. Our records indicate that you already have an active [employer] account. You can access your account anytime at [email or web address] Did you know that you can access your hospital and ER discharge instructions at any time in [employer]? You can also review all of your test results from your hospital stay or ER visit. Additional Information If you have questions, please visit the Frequently Asked Questions section of the [employer] website at [email or web address] Remember, [employer] is NOT to be used for urgent needs. For medical emergencies, dial 911. Now available from your iPhone and Android! Please provide this summary of care documentation to your next provider. Your primary care clinician is listed as [id number] [place] [address] [address]. If you have any questions after today's visit, please call [phone].

## 2017-12-01 LAB
ALBUMIN SERPL-MCNC: 4.6 G/DL (ref 3.6–4.8)
ALBUMIN/GLOB SERPL: 1.7 {RATIO} (ref 1.2–2.2)
ALP SERPL-CCNC: 66 IU/L (ref 39–117)
ALT SERPL-CCNC: 16 IU/L (ref 0–44)
AST SERPL-CCNC: 13 IU/L (ref 0–40)
BILIRUB SERPL-MCNC: 0.5 MG/DL (ref 0–1.2)
BUN SERPL-MCNC: 16 MG/DL (ref 8–27)
BUN/CREAT SERPL: 19 (ref 10–24)
CALCIUM SERPL-MCNC: 9.4 MG/DL (ref 8.6–10.2)
CHLORIDE SERPL-SCNC: 102 MMOL/L (ref 96–106)
CHOLEST SERPL-MCNC: 131 MG/DL (ref 100–199)
CO2 SERPL-SCNC: 24 MMOL/L (ref 18–29)
CREAT SERPL-MCNC: 0.85 MG/DL (ref 0.76–1.27)
EST. AVERAGE GLUCOSE BLD GHB EST-MCNC: 131 MG/DL
GFR SERPLBLD CREATININE-BSD FMLA CKD-EPI: 108 ML/MIN/1.73
GFR SERPLBLD CREATININE-BSD FMLA CKD-EPI: 93 ML/MIN/1.73
GLOBULIN SER CALC-MCNC: 2.7 G/DL (ref 1.5–4.5)
GLUCOSE SERPL-MCNC: 120 MG/DL (ref 65–99)
HBA1C MFR BLD: 6.2 % (ref 4.8–5.6)
HDLC SERPL-MCNC: 36 MG/DL
INTERPRETATION, 910389: NORMAL
LDLC SERPL CALC-MCNC: 71 MG/DL (ref 0–99)
Lab: NORMAL
POTASSIUM SERPL-SCNC: 4.4 MMOL/L (ref 3.5–5.2)
PROT SERPL-MCNC: 7.3 G/DL (ref 6–8.5)
SODIUM SERPL-SCNC: 141 MMOL/L (ref 134–144)
TRIGL SERPL-MCNC: 120 MG/DL (ref 0–149)
VLDLC SERPL CALC-MCNC: 24 MG/DL (ref 5–40)

## 2018-01-19 DIAGNOSIS — E78.00 HYPERCHOLESTEROLEMIA: ICD-10-CM

## 2018-01-19 DIAGNOSIS — I10 ESSENTIAL HYPERTENSION: ICD-10-CM

## 2018-01-19 RX ORDER — ATORVASTATIN CALCIUM 40 MG/1
TABLET, FILM COATED ORAL
Qty: 90 TAB | Refills: 1 | Status: SHIPPED | OUTPATIENT
Start: 2018-01-19 | End: 2018-03-19 | Stop reason: SDUPTHER

## 2018-01-19 RX ORDER — GLIMEPIRIDE 4 MG/1
TABLET ORAL
Qty: 90 TAB | Refills: 1 | Status: SHIPPED | OUTPATIENT
Start: 2018-01-19 | End: 2018-03-19 | Stop reason: SDUPTHER

## 2018-01-19 RX ORDER — LOSARTAN POTASSIUM 100 MG/1
TABLET ORAL
Qty: 90 TAB | Refills: 1 | Status: SHIPPED | OUTPATIENT
Start: 2018-01-19 | End: 2018-03-19 | Stop reason: SDUPTHER

## 2018-01-19 RX ORDER — CITALOPRAM 40 MG/1
TABLET, FILM COATED ORAL
Qty: 90 TAB | Refills: 1 | Status: SHIPPED | OUTPATIENT
Start: 2018-01-19 | End: 2018-03-19 | Stop reason: SDUPTHER

## 2018-02-01 RX ORDER — METFORMIN HYDROCHLORIDE 500 MG/1
TABLET, EXTENDED RELEASE ORAL
Qty: 360 TAB | Refills: 1 | Status: SHIPPED | OUTPATIENT
Start: 2018-02-01 | End: 2018-03-19 | Stop reason: SDUPTHER

## 2018-03-19 DIAGNOSIS — E78.00 HYPERCHOLESTEROLEMIA: ICD-10-CM

## 2018-03-19 DIAGNOSIS — I10 ESSENTIAL HYPERTENSION: ICD-10-CM

## 2018-03-19 RX ORDER — CITALOPRAM 40 MG/1
TABLET, FILM COATED ORAL
Qty: 90 TAB | Refills: 1 | Status: SHIPPED | OUTPATIENT
Start: 2018-03-19 | End: 2018-09-04 | Stop reason: DRUGHIGH

## 2018-03-19 RX ORDER — METFORMIN HYDROCHLORIDE 500 MG/1
TABLET, EXTENDED RELEASE ORAL
Qty: 360 TAB | Refills: 1 | Status: SHIPPED | OUTPATIENT
Start: 2018-03-19

## 2018-03-19 RX ORDER — LOSARTAN POTASSIUM 100 MG/1
TABLET ORAL
Qty: 90 TAB | Refills: 1 | Status: SHIPPED | OUTPATIENT
Start: 2018-03-19 | End: 2018-09-26 | Stop reason: SDUPTHER

## 2018-03-19 RX ORDER — ATORVASTATIN CALCIUM 40 MG/1
TABLET, FILM COATED ORAL
Qty: 90 TAB | Refills: 1 | Status: SHIPPED | OUTPATIENT
Start: 2018-03-19 | End: 2018-12-17 | Stop reason: ALTCHOICE

## 2018-03-19 RX ORDER — GLIMEPIRIDE 4 MG/1
TABLET ORAL
Qty: 90 TAB | Refills: 1 | Status: SHIPPED | OUTPATIENT
Start: 2018-03-19

## 2018-05-14 ENCOUNTER — OFFICE VISIT (OUTPATIENT)
Dept: INTERNAL MEDICINE CLINIC | Age: 63
End: 2018-05-14

## 2018-05-14 VITALS
HEART RATE: 63 BPM | RESPIRATION RATE: 17 BRPM | WEIGHT: 199.4 LBS | HEIGHT: 68 IN | DIASTOLIC BLOOD PRESSURE: 76 MMHG | OXYGEN SATURATION: 97 % | TEMPERATURE: 98.1 F | BODY MASS INDEX: 30.22 KG/M2 | SYSTOLIC BLOOD PRESSURE: 135 MMHG

## 2018-05-14 DIAGNOSIS — I10 ESSENTIAL HYPERTENSION: ICD-10-CM

## 2018-05-14 DIAGNOSIS — E11.9 DIABETES MELLITUS WITHOUT COMPLICATION (HCC): Primary | ICD-10-CM

## 2018-05-14 DIAGNOSIS — E66.9 OBESITY (BMI 30.0-34.9): ICD-10-CM

## 2018-05-14 DIAGNOSIS — F42.9 OBSESSIVE-COMPULSIVE DISORDER, UNSPECIFIED TYPE: ICD-10-CM

## 2018-05-14 DIAGNOSIS — E78.00 HYPERCHOLESTEROLEMIA: ICD-10-CM

## 2018-05-14 DIAGNOSIS — Z11.59 ENCOUNTER FOR HEPATITIS C SCREENING TEST FOR LOW RISK PATIENT: ICD-10-CM

## 2018-05-14 NOTE — PROGRESS NOTES
Chief Complaint   Patient presents with    Follow Up Chronic Condition     HTN,Lipids    Blood sugar problem     discuss coming off BS meds as it is controlled via diet and exercise. 1. Have you been to the ER, urgent care clinic since your last visit? Hospitalized since your last visit? No    2. Have you seen or consulted any other health care providers outside of the [facility] since your last visit? Include any pap smears or colon screening. No     patient has lowered BS and has it welled controlled with diet and exercise. patient would like to further decrease meds as he was able to stop using insulin.

## 2018-05-15 LAB
ALBUMIN SERPL-MCNC: 4.3 G/DL (ref 3.6–4.8)
ALBUMIN/CREAT UR: 4.1 MG/G CREAT (ref 0–30)
ALBUMIN/GLOB SERPL: 1.6 {RATIO} (ref 1.2–2.2)
ALP SERPL-CCNC: 50 IU/L (ref 39–117)
ALT SERPL-CCNC: 13 IU/L (ref 0–44)
APPEARANCE UR: CLEAR
AST SERPL-CCNC: 12 IU/L (ref 0–40)
BILIRUB SERPL-MCNC: 0.5 MG/DL (ref 0–1.2)
BILIRUB UR QL STRIP: NEGATIVE
BUN SERPL-MCNC: 25 MG/DL (ref 8–27)
BUN/CREAT SERPL: 37 (ref 10–24)
CALCIUM SERPL-MCNC: 9.2 MG/DL (ref 8.6–10.2)
CHLORIDE SERPL-SCNC: 97 MMOL/L (ref 96–106)
CHOLEST SERPL-MCNC: 141 MG/DL (ref 100–199)
CO2 SERPL-SCNC: 25 MMOL/L (ref 18–29)
COLOR UR: YELLOW
CREAT SERPL-MCNC: 0.68 MG/DL (ref 0.76–1.27)
CREAT UR-MCNC: 91.6 MG/DL
EST. AVERAGE GLUCOSE BLD GHB EST-MCNC: 123 MG/DL
GFR SERPLBLD CREATININE-BSD FMLA CKD-EPI: 102 ML/MIN/1.73
GFR SERPLBLD CREATININE-BSD FMLA CKD-EPI: 118 ML/MIN/1.73
GLOBULIN SER CALC-MCNC: 2.7 G/DL (ref 1.5–4.5)
GLUCOSE SERPL-MCNC: 145 MG/DL (ref 65–99)
GLUCOSE UR QL: NEGATIVE
HBA1C MFR BLD: 5.9 % (ref 4.8–5.6)
HCV AB S/CO SERPL IA: <0.1 S/CO RATIO (ref 0–0.9)
HDLC SERPL-MCNC: 40 MG/DL
HGB UR QL STRIP: NEGATIVE
INTERPRETATION, 910389: NORMAL
KETONES UR QL STRIP: NEGATIVE
LDLC SERPL CALC-MCNC: 81 MG/DL (ref 0–99)
LEUKOCYTE ESTERASE UR QL STRIP: NEGATIVE
Lab: NORMAL
MICRO URNS: NORMAL
MICROALBUMIN UR-MCNC: 3.8 UG/ML
NITRITE UR QL STRIP: NEGATIVE
PH UR STRIP: 5.5 [PH] (ref 5–7.5)
POTASSIUM SERPL-SCNC: 4.6 MMOL/L (ref 3.5–5.2)
PROT SERPL-MCNC: 7 G/DL (ref 6–8.5)
PROT UR QL STRIP: NEGATIVE
SODIUM SERPL-SCNC: 137 MMOL/L (ref 134–144)
SP GR UR: 1.03 (ref 1–1.03)
TRIGL SERPL-MCNC: 101 MG/DL (ref 0–149)
UROBILINOGEN UR STRIP-MCNC: 0.2 MG/DL (ref 0.2–1)
VLDLC SERPL CALC-MCNC: 20 MG/DL (ref 5–40)

## 2018-09-04 ENCOUNTER — OFFICE VISIT (OUTPATIENT)
Dept: INTERNAL MEDICINE CLINIC | Age: 63
End: 2018-09-04

## 2018-09-04 ENCOUNTER — TELEPHONE (OUTPATIENT)
Dept: INTERNAL MEDICINE CLINIC | Age: 63
End: 2018-09-04

## 2018-09-04 VITALS
DIASTOLIC BLOOD PRESSURE: 75 MMHG | WEIGHT: 200 LBS | HEIGHT: 68 IN | HEART RATE: 59 BPM | SYSTOLIC BLOOD PRESSURE: 131 MMHG | RESPIRATION RATE: 20 BRPM | BODY MASS INDEX: 30.31 KG/M2 | OXYGEN SATURATION: 98 % | TEMPERATURE: 97.6 F

## 2018-09-04 DIAGNOSIS — E66.9 OBESITY (BMI 30.0-34.9): ICD-10-CM

## 2018-09-04 DIAGNOSIS — E11.9 DIABETES MELLITUS WITHOUT COMPLICATION (HCC): Primary | ICD-10-CM

## 2018-09-04 DIAGNOSIS — E78.00 HYPERCHOLESTEROLEMIA: ICD-10-CM

## 2018-09-04 DIAGNOSIS — I10 ESSENTIAL HYPERTENSION: ICD-10-CM

## 2018-09-04 RX ORDER — CITALOPRAM 20 MG/1
20 TABLET, FILM COATED ORAL DAILY
Qty: 90 TAB | Refills: 1 | Status: SHIPPED | OUTPATIENT
Start: 2018-09-04 | End: 2018-12-17 | Stop reason: ALTCHOICE

## 2018-09-04 NOTE — TELEPHONE ENCOUNTER
Contacted patient in reference to more information, spelling, phone number of Eye doctor (Blanca Gonzalez). Patient will call with further information.

## 2018-09-04 NOTE — PROGRESS NOTES
Chief Complaint Patient presents with  Follow Up Chronic Condition  
  diabetes, cholesterol, no other concerns Reviewed record in preparation for visit and have obtained necessary documentation. Identified pt with two pt identifiers(name and ). Health Maintenance Due Topic  
 EYE EXAM RETINAL OR DILATED Q1   
 Pneumococcal 19-64 Medium Risk (1 of 1 - PPSV23)  DTaP/Tdap/Td series (1 - Tdap)  ZOSTER VACCINE AGE 60>   
 Influenza Age 5 to Adult  FOOT EXAM Q1 Chief Complaint Patient presents with  Follow Up Chronic Condition  
  diabetes, cholesterol, no other concerns Wt Readings from Last 3 Encounters:  
18 200 lb (90.7 kg) 18 199 lb 6.4 oz (90.4 kg) 17 203 lb 6.4 oz (92.3 kg) Temp Readings from Last 3 Encounters:  
18 97.6 °F (36.4 °C) (Oral) 18 98.1 °F (36.7 °C) (Oral)  
17 97.9 °F (36.6 °C) (Oral) BP Readings from Last 3 Encounters:  
18 131/75  
18 135/76  
17 138/78 Pulse Readings from Last 3 Encounters:  
18 (!) 59  
18 63  
17 64 Learning Assessment: 
:  
 
Learning Assessment 2014 PRIMARY LEARNER Patient PRIMARY LANGUAGE ENGLISH  
LEARNER PREFERENCE PRIMARY DEMONSTRATION  
  READING  
  VIDEOS  
ANSWERED BY patient RELATIONSHIP SELF Depression Screening: 
:  
 
PHQ over the last two weeks 2018 Little interest or pleasure in doing things Not at all Feeling down, depressed, irritable, or hopeless Not at all Total Score PHQ 2 0 Fall Risk Assessment: 
:  
 
Fall Risk Assessment, last 12 mths 2018 Able to walk? Yes Fall in past 12 months? No  
 
 
Abuse Screening: 
:  
 
Abuse Screening Questionnaire 2017 Do you ever feel afraid of your partner? Jenny Ores Are you in a relationship with someone who physically or mentally threatens you? Jenny Ores Is it safe for you to go home? Sejal Guardian Coordination of Care Questionnaire: 
:  
 1) Have you been to an emergency room, urgent care clinic since your last visit? no  
Hospitalized since your last visit? no          
 
2) Have you seen or consulted any other health care providers outside of [address] [place] [address] since your last visit? yes  (Include any pap smears or colon screenings in this section.) 3) Do you have an Advance Directive on file? no 
 
4) Are you interested in receiving information on Advance Directives? YES Patient is accompanied by self I have received verbal consent from Nellie Vann to discuss any/all medical information while they are present in the room.

## 2018-09-04 NOTE — MR AVS SNAPSHOT
[address] [place] [address] [address] [place] [address] 
[phone] Patient: Kam Prieto MRN: [id number] :1955 Visit Information Date & Time Provider Department Dept. Phone Encounter #  
 2018 10:20 AM Edi Zarate MD [facility] [phone] [id number] Upcoming Health Maintenance Date Due  
 EYE EXAM RETINAL OR DILATED Q1 10/18/1965 Pneumococcal 19-64 Medium Risk (1 of 1 - PPSV23) 10/18/1974 DTaP/Tdap/Td series (1 - Tdap) 10/18/1976 ZOSTER VACCINE AGE 60> 2015 Influenza Age 5 to Adult 2018 FOOT EXAM Q1 2018 COLONOSCOPY 2019* HEMOGLOBIN A1C Q6M 2018 MICROALBUMIN Q1 2019 LIPID PANEL Q1 2019 *Topic was postponed. The date shown is not the original due date. Allergies as of 2018  Review Complete On: 2018 By: Edi Zarate MD  
 No Known Allergies Current Immunizations  Reviewed on 2017 Name Date Influenza Vaccine 10/21/2014  9:09 AM  
 Influenza Vaccine (Quad) 11/3/2015 Influenza Vaccine (Quad) PF 2017, 10/21/2016 Not reviewed this visit You Were Diagnosed With   
  
 Codes Comments Diabetes mellitus without complication ([facility]    -  Primary ICD-10-CM: E11.9 ICD-9-CM: 250.00 Hypercholesterolemia     ICD-10-CM: E78.00 ICD-9-CM: 272.0 Essential hypertension     ICD-10-CM: I10 
ICD-9-CM: 401.9 Vitals BP Pulse Temp Resp Height(growth percentile) Weight(growth percentile) 131/75 (!) 59 97.6 °F (36.4 °C) (Oral) 20 5' 8\" (1.727 m) 200 lb (90.7 kg) SpO2 BMI Smoking Status 98% 30.41 kg/m2 Never Smoker Vitals History BMI and BSA Data Body Mass Index Body Surface Area  
 30.41 kg/m 2 2.09 m 2 Preferred Pharmacy Pharmacy Name Phone Mya [facility], [facility] [phone] Your Updated Medication List  
  
   
This list is accurate as of 18 10:58 AM.  Always use your most recent med list.  
  
  
  
  
 aspirin 81 mg chewable tablet Take 81 mg by mouth daily. atorvastatin 40 mg tablet Commonly known as:  LIPITOR  
TAKE 1 TABLET IN THE EVENING  
  
 citalopram 20 mg tablet Commonly known as:  Rosalina Letters Take 1 Tab by mouth daily. glimepiride 4 mg tablet Commonly known as:  AMARYL  
TAKE 1 TABLET IN THE MORNING  
  
 losartan 100 mg tablet Commonly known as:  COZAAR  
TAKE 1 TABLET DAILY  
  
 metFORMIN  mg tablet Commonly known as:  GLUCOPHAGE XR  
TAKE 4 TABLETS ONCE DAILY WITH DINNER  
  
 pneumococcal 23-valent 25 mcg/0.5 mL injection Commonly known as:  PNEUMOVAX 23  
0.5 mL by IntraMUSCular route PRIOR TO DISCHARGE for 1 dose. varicella-zoster recombinant (PF) 50 mcg/0.5 mL Susr injection Commonly known as:  SHINGRIX (PF)  
0.5 mL by IntraMUSCular route once for 1 dose. Repeat x 1 in 2-6 months Prescriptions Printed Refills  
 pneumococcal 23-valent (PNEUMOVAX 23) 25 mcg/0.5 mL injection 0 Si.5 mL by IntraMUSCular route PRIOR TO DISCHARGE for 1 dose. Class: Print Route: IntraMUSCular  
 varicella-zoster recombinant, PF, (SHINGRIX, PF,) 50 mcg/0.5 mL susr injection 1 Si.5 mL by IntraMUSCular route once for 1 dose. Repeat x 1 in 2-6 months Class: Print Route: IntraMUSCular Prescriptions Sent to Pharmacy Refills  
 citalopram (CELEXA) 20 mg tablet 1 Sig: Take 1 Tab by mouth daily. Class: Normal  
 Pharmacy: [facility], [address] [place] [address] Ph #: [phone] Route: Oral  
  
We Performed the Following HEMOGLOBIN A1C WITH EAG [86431 CPT(R)]  DIABETES FOOT EXAM [HM7 Custom] LIPID PANEL [52405 CPT(R)] METABOLIC PANEL, COMPREHENSIVE [11680 CPT(R)] Introducing [facility] & HEALTH SERVICES!    
 Dear Ubaldo Redmond: 
 Thank you for requesting a [employer] account. Our records indicate that you already have an active [employer] account. You can access your account anytime at [email or web address] Did you know that you can access your hospital and ER discharge instructions at any time in [employer]? You can also review all of your test results from your hospital stay or ER visit. Additional Information If you have questions, please visit the Frequently Asked Questions section of the [employer] website at [email or web address] Remember, [employer] is NOT to be used for urgent needs. For medical emergencies, dial 911. Now available from your iPhone and Android! Please provide this summary of care documentation to your next provider. Your primary care clinician is listed as ANTON QUESADA. If you have any questions after today's visit, please call [phone].

## 2018-09-04 NOTE — PROGRESS NOTES
Subjective:  
 
Marylene Fury is a 58 y.o. male seen for follow up of diabetes. He also has hypertension and hyperlipidemia. Diabetic Review of Systems - medication compliance: compliant all of the time - currently only taking 2 metformin daily, taking amaryl 2-3 times weekly (when BS > 130), diabetic diet compliance: currently just eating vegetables and protien, has cut back more on his sugars, home glucose monitoring: is performed regularly, fasting values range 130-140, premeals 100-110s, further diabetic ROS: no polyuria or polydipsia, no chest pain, dyspnea or TIA's, no numbness, tingling or pain in extremities, no unusual visual symptoms, no hypoglycemia, last eye exam approximately 11 months ago. Other symptoms and concerns:  
Tried to wean off and stop citalopram but had extreme agitation and \"hard to get along with\". He has been slowly tapering since, currently on 1/2 tab every other day for the last week and doing better. . 
 
Current Outpatient Prescriptions Medication Sig Dispense Refill  glimepiride (AMARYL) 4 mg tablet TAKE 1 TABLET IN THE MORNING (Patient taking differently: TAKE 1 TABLET IN THE MORNING PRN) 90 Tab 1  
 atorvastatin (LIPITOR) 40 mg tablet TAKE 1 TABLET IN THE EVENING 90 Tab 1  
 metFORMIN ER (GLUCOPHAGE XR) 500 mg tablet TAKE 4 TABLETS ONCE DAILY WITH DINNER (Patient taking differently: Take 1,000 mg by mouth daily (with dinner). TAKE 2 TABLETS ONCE DAILY WITH DINNER) 360 Tab 1  citalopram (CELEXA) 40 mg tablet TAKE 1 TABLET DAILY 90 Tab 1  
 losartan (COZAAR) 100 mg tablet TAKE 1 TABLET DAILY 90 Tab 1  
 aspirin 81 mg chewable tablet Take 81 mg by mouth daily. Lab Results Component Value Date/Time Hemoglobin A1c 5.9 (H) 05/14/2018 10:19 AM  
Hemoglobin A1c 6.2 (H) 11/30/2017 08:34 AM  
Hemoglobin A1c 9.8 (H) 08/31/2017 09:42 AM  
Glucose 145 (H) 05/14/2018 10:19 AM  
Microalb/Creat ratio (ug/mg creat.) 4.1 05/14/2018 10:19 AM  
 LDL, calculated 81 05/14/2018 10:19 AM  
Creatinine 0.68 (L) 05/14/2018 10:19 AM  
  
Lab Results Component Value Date/Time Cholesterol, total 141 05/14/2018 10:19 AM  
HDL Cholesterol 40 05/14/2018 10:19 AM  
LDL, calculated 81 05/14/2018 10:19 AM  
Triglyceride 101 05/14/2018 10:19 AM  
 
Lab Results Component Value Date/Time ALT (SGPT) 13 05/14/2018 10:19 AM  
AST (SGOT) 12 05/14/2018 10:19 AM  
Alk. phosphatase 50 05/14/2018 10:19 AM  
Bilirubin, total 0.5 05/14/2018 10:19 AM  
Albumin 4.3 05/14/2018 10:19 AM  
Protein, total 7.0 05/14/2018 10:19 AM  
PLATELET 011 63/40/1902 09:57 AM  
 
 
Lab Results Component Value Date/Time GFR est non- 05/14/2018 10:19 AM  
GFR est  05/14/2018 10:19 AM  
Creatinine 0.68 (L) 05/14/2018 10:19 AM  
BUN 25 05/14/2018 10:19 AM  
Sodium 137 05/14/2018 10:19 AM  
Potassium 4.6 05/14/2018 10:19 AM  
Chloride 97 05/14/2018 10:19 AM  
CO2 25 05/14/2018 10:19 AM  
  
 
Review of Systems Pertinent items are noted in HPI. Objective:  
 
Visit Vitals  /75  Pulse (!) 59  Temp 97.6 °F (36.4 °C) (Oral)  Resp 20  
 Ht 5' 8\" (1.727 m)  Wt 200 lb (90.7 kg)  SpO2 98%  BMI 30.41 kg/m2 Appearance: alert, well appearing, and in no distress and oriented to person, place, and time. Exam: NECK: supple, no lad, no bruit, no tm LUNGS: cta bilat CV rrr, no m/g/r ABD: soft, nt, nd, nabs EXT: no c/c/e 
feet: warm, good capillary refill, no trophic changes or ulcerative lesions, normal DP and PT pulses and reduced sensation at right foot to filament testing only, nml to light touch. Left foot decreased sensation to light touch and filament testing. Onychomycosis Sol Riser Assessment/Plan:  
 
diabetes well controlled. Diabetic issues reviewed with him: continue current medicaitons. continue to work on diet and exercise. Check labs Foot exam completed today 
 
htn - controlled, cont same Hyperlipidemia- controlled, cont same Check labs Obesity - continue to work on diet and exercise for weight loss. Orders Placed This Encounter  METABOLIC PANEL, COMPREHENSIVE  LIPID PANEL  
 HEMOGLOBIN A1C WITH EAG  
 citalopram (CELEXA) 20 mg tablet  pneumococcal 23-valent (PNEUMOVAX 23) 25 mcg/0.5 mL injection  varicella-zoster recombinant, PF, (SHINGRIX, PF,) 50 mcg/0.5 mL susr injection Follow-up Disposition: 
Return in about 3 months (around 12/4/2018) for diabetes, hyperlipidemia, htn.

## 2018-09-05 LAB
ALBUMIN SERPL-MCNC: 4.7 G/DL (ref 3.6–4.8)
ALBUMIN/GLOB SERPL: 1.7 {RATIO} (ref 1.2–2.2)
ALP SERPL-CCNC: 57 IU/L (ref 39–117)
ALT SERPL-CCNC: 27 IU/L (ref 0–44)
AST SERPL-CCNC: 19 IU/L (ref 0–40)
BILIRUB SERPL-MCNC: 0.5 MG/DL (ref 0–1.2)
BUN SERPL-MCNC: 23 MG/DL (ref 8–27)
BUN/CREAT SERPL: 29 (ref 10–24)
CALCIUM SERPL-MCNC: 9.6 MG/DL (ref 8.6–10.2)
CHLORIDE SERPL-SCNC: 102 MMOL/L (ref 96–106)
CHOLEST SERPL-MCNC: 122 MG/DL (ref 100–199)
CO2 SERPL-SCNC: 24 MMOL/L (ref 20–29)
CREAT SERPL-MCNC: 0.79 MG/DL (ref 0.76–1.27)
EST. AVERAGE GLUCOSE BLD GHB EST-MCNC: 126 MG/DL
GLOBULIN SER CALC-MCNC: 2.7 G/DL (ref 1.5–4.5)
GLUCOSE SERPL-MCNC: 126 MG/DL (ref 65–99)
HBA1C MFR BLD: 6 % (ref 4.8–5.6)
HDLC SERPL-MCNC: 35 MG/DL
INTERPRETATION, 910389: NORMAL
LDLC SERPL CALC-MCNC: 68 MG/DL (ref 0–99)
Lab: NORMAL
POTASSIUM SERPL-SCNC: 4.6 MMOL/L (ref 3.5–5.2)
PROT SERPL-MCNC: 7.4 G/DL (ref 6–8.5)
SODIUM SERPL-SCNC: 141 MMOL/L (ref 134–144)
TRIGL SERPL-MCNC: 93 MG/DL (ref 0–149)
VLDLC SERPL CALC-MCNC: 19 MG/DL (ref 5–40)

## 2018-12-17 ENCOUNTER — OFFICE VISIT (OUTPATIENT)
Dept: INTERNAL MEDICINE CLINIC | Age: 63
End: 2018-12-17

## 2018-12-17 VITALS
DIASTOLIC BLOOD PRESSURE: 78 MMHG | SYSTOLIC BLOOD PRESSURE: 130 MMHG | HEART RATE: 64 BPM | WEIGHT: 214.6 LBS | HEIGHT: 68 IN | RESPIRATION RATE: 18 BRPM | BODY MASS INDEX: 32.52 KG/M2 | OXYGEN SATURATION: 99 % | TEMPERATURE: 98 F

## 2018-12-17 DIAGNOSIS — E66.9 OBESITY (BMI 30.0-34.9): ICD-10-CM

## 2018-12-17 DIAGNOSIS — E78.00 HYPERCHOLESTEROLEMIA: ICD-10-CM

## 2018-12-17 DIAGNOSIS — E11.9 DIABETES MELLITUS WITHOUT COMPLICATION (HCC): Primary | ICD-10-CM

## 2018-12-17 DIAGNOSIS — I10 ESSENTIAL HYPERTENSION: ICD-10-CM

## 2018-12-17 NOTE — PROGRESS NOTES
Edward Loredo is a 61 y.o. male    Fasting  Patient declined flu vaccine today     Chief Complaint   Patient presents with    Hypertension     follow up    Diabetes       1. Have you been to the ER, urgent care clinic since your last visit? Hospitalized since your last visit? No  M  2. Have you seen or consulted any other health care providers outside of the [address] [place] [address] since your last visit? Include any pap smears or colon screening.   No    Visit Vitals  /84 (BP 1 Location: Left arm, BP Patient Position: Sitting)   Pulse 64   Temp 98 °F (36.7 °C) (Oral)   Resp 18   Ht 5' 8\" (1.727 m)   Wt 214 lb 9.6 oz (97.3 kg)   SpO2 99%   BMI 32.63 kg/m²           Health Maintenance Due   Topic Date Due    Pneumococcal 19-64 Medium Risk (1 of 1 - PPSV23) 10/18/1974    DTaP/Tdap/Td series (1 - Tdap) 10/18/1976    Shingrix Vaccine Age 50> (1 of 2) 10/18/2005    Influenza Age 5 to Adult  08/01/2018

## 2018-12-17 NOTE — PROGRESS NOTES
Spoke to mom about using the medication from Dr. Calixto. She has been using it and it looks like the thrush is gone.   Subjective:     Yale Seip is a 61 y.o. male seen for follow up of diabetes. He also has hypertension and hyperlipidemia. Diabetic Review of Systems - medication compliance: compliant all of the time, diabetic diet compliance: compliant all of the time, home glucose monitoring: is performed regularly, fasting values range 100-120. If > 120 (130s) will take an Amaryl. Cut back on metformin to 1 tab with supper only. Further diabetic ROS: no polyuria or polydipsia, no chest pain, dyspnea or TIA's, no numbness, tingling or pain in extremities, no unusual visual symptoms, no hypoglycemia, last eye exam approximately 2 months ago. Other symptoms and concerns:   He has stopped his statin after last visit in September. Does not want to take. Feels the statin has caused his DM. Declines flu shot and all other vaccinations over fears of mercury. Blood pressure at home is running 120/70s      Current Outpatient Medications   Medication Sig Dispense Refill    losartan (COZAAR) 100 mg tablet TAKE 1 TABLET DAILY 90 Tab 3    glimepiride (AMARYL) 4 mg tablet TAKE 1 TABLET IN THE MORNING (Patient taking differently: TAKE 1 TABLET IN THE MORNING PRN) 90 Tab 1    metFORMIN ER (GLUCOPHAGE XR) 500 mg tablet TAKE 4 TABLETS ONCE DAILY WITH DINNER (Patient taking differently: Take 1,000 mg by mouth daily (with dinner). TAKE 2 TABLETS ONCE DAILY WITH DINNER) 360 Tab 1    aspirin 81 mg chewable tablet Take 81 mg by mouth daily.  pneumococcal 23-valent (PNEUMOVAX 23) 25 mcg/0.5 mL injection 0.5 mL by IntraMUSCular route PRIOR TO DISCHARGE for 1 dose.  0.5 mL 0        Lab Results   Component Value Date/Time    Hemoglobin A1c 6.0 (H) 09/04/2018 11:11 AM    Hemoglobin A1c 5.9 (H) 05/14/2018 10:19 AM    Hemoglobin A1c 6.2 (H) 11/30/2017 08:34 AM    Glucose 126 (H) 09/04/2018 11:11 AM    Microalb/Creat ratio (ug/mg creat.) 4.1 05/14/2018 10:19 AM    LDL, calculated 68 09/04/2018 11:11 AM    Creatinine 0.79 09/04/2018 11:11 AM      Lab Results   Component Value Date/Time    Cholesterol, total 122 09/04/2018 11:11 AM    HDL Cholesterol 35 (L) 09/04/2018 11:11 AM    LDL, calculated 68 09/04/2018 11:11 AM    Triglyceride 93 09/04/2018 11:11 AM     Lab Results   Component Value Date/Time    ALT (SGPT) 27 09/04/2018 11:11 AM    AST (SGOT) 19 09/04/2018 11:11 AM    Alk. phosphatase 57 09/04/2018 11:11 AM    Bilirubin, total 0.5 09/04/2018 11:11 AM    Albumin 4.7 09/04/2018 11:11 AM    Protein, total 7.4 09/04/2018 11:11 AM    PLATELET 249 70/60/0066 09:57 AM     Lab Results   Component Value Date/Time    GFR est non-AA 96 09/04/2018 11:11 AM    GFR est  09/04/2018 11:11 AM    Creatinine 0.79 09/04/2018 11:11 AM    BUN 23 09/04/2018 11:11 AM    Sodium 141 09/04/2018 11:11 AM    Potassium 4.6 09/04/2018 11:11 AM    Chloride 102 09/04/2018 11:11 AM    CO2 24 09/04/2018 11:11 AM        Review of Systems  Pertinent items are noted in HPI. Objective:     Visit Vitals  /78   Pulse 64   Temp 98 °F (36.7 °C) (Oral)   Resp 18   Ht 5' 8\" (1.727 m)   Wt 214 lb 9.6 oz (97.3 kg)   SpO2 99%   BMI 32.63 kg/m²     Appearance: alert, well appearing, and in no distress and oriented to person, place, and time. Exam:   NECK: supple, no lad, no bruit, no tm  LUNGS: cta bilat  CV rrr, no m/g/r  ABD: soft, nt, nd, nabs  EXT: no c/c/e    Assessment/Plan:     diabetes well controlled. Diabetic issues reviewed with him: continue DM diet. Will stop Amaryl PRN use. Continue 1 metformin daily. .   Check labs    htn - controlled, cont same    Hyperlipidemia - off statin x 3 months. Will repeat labs. Doubt would restart    Obesity - discussed diet and exercise for weight loss. Anxiety - off citalopram and doing well. Orders Placed This Encounter    METABOLIC PANEL, COMPREHENSIVE    LIPID PANEL    HEMOGLOBIN A1C WITH EAG     Follow-up Disposition:  Return in about 3 months (around 3/17/2019) for diabetes, hyperlipidemia, htn.

## 2020-01-08 ENCOUNTER — HOSPITAL ENCOUNTER (EMERGENCY)
Age: 65
Discharge: HOME | End: 2020-01-08
Attending: EMERGENCY MEDICINE | Admitting: EMERGENCY MEDICINE
Payer: COMMERCIAL

## 2020-01-08 ENCOUNTER — APPOINTMENT (OUTPATIENT)
Dept: GENERAL RADIOLOGY | Age: 65
End: 2020-01-08
Attending: EMERGENCY MEDICINE
Payer: COMMERCIAL

## 2020-01-08 VITALS
HEIGHT: 72 IN | WEIGHT: 210 LBS | OXYGEN SATURATION: 98 % | HEART RATE: 78 BPM | TEMPERATURE: 98 F | DIASTOLIC BLOOD PRESSURE: 74 MMHG | BODY MASS INDEX: 28.44 KG/M2 | RESPIRATION RATE: 18 BRPM | SYSTOLIC BLOOD PRESSURE: 122 MMHG

## 2020-01-08 DIAGNOSIS — S20.222A CONTUSION OF LEFT SIDE OF BACK, INITIAL ENCOUNTER: ICD-10-CM
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
## 2020-01-08 PROCEDURE — 99284 EMERGENCY DEPT VISIT MOD MDM: CPT

## 2020-01-08 PROCEDURE — 74011250637 HC RX REV CODE- 250/637: Performed by: EMERGENCY MEDICINE

## 2020-01-08 PROCEDURE — 72072 X-RAY EXAM THORAC SPINE 3VWS: CPT

## 2020-01-08 PROCEDURE — 74011000250 HC RX REV CODE- 250: Performed by: EMERGENCY MEDICINE

## 2020-01-08 PROCEDURE — 71101 X-RAY EXAM UNILAT RIBS/CHEST: CPT

## 2020-01-08 RX ORDER — METHOCARBAMOL 500 MG/1
500 TABLET, FILM COATED ORAL
Qty: 20 TAB | Refills: 0 | Status: SHIPPED | OUTPATIENT
Start: 2020-01-08 | End: 2020-01-13

## 2020-01-08 RX ORDER — METHOCARBAMOL 500 MG/1
500 TABLET, FILM COATED ORAL ONCE
Status: COMPLETED | OUTPATIENT
Start: 2020-01-08 | End: 2020-01-08

## 2020-01-08 RX ORDER — ACETAMINOPHEN 500 MG
1000 TABLET ORAL
Status: COMPLETED | OUTPATIENT
Start: 2020-01-08 | End: 2020-01-08

## 2020-01-08 RX ORDER — IBUPROFEN 400 MG/1
400 TABLET ORAL
Qty: 15 TAB | Refills: 0 | Status: SHIPPED | OUTPATIENT
Start: 2020-01-08

## 2020-01-08 RX ORDER — LIDOCAINE 50 MG/G
1 PATCH TOPICAL EVERY 24 HOURS
Qty: 5 PATCH | Refills: 0 | Status: SHIPPED | OUTPATIENT
Start: 2020-01-08

## 2020-01-08 RX ORDER — ACETAMINOPHEN 500 MG
1000 TABLET ORAL 3 TIMES DAILY
Qty: 24 TAB | Refills: 0 | Status: SHIPPED | OUTPATIENT
Start: 2020-01-08 | End: 2020-01-12

## 2020-01-08 RX ORDER — IBUPROFEN 600 MG/1
600 TABLET ORAL
Status: COMPLETED | OUTPATIENT
Start: 2020-01-08 | End: 2020-01-08

## 2020-01-08 RX ORDER — LIDOCAINE 4 G/100G
1 PATCH TOPICAL EVERY 24 HOURS
Status: DISCONTINUED | OUTPATIENT
Start: 2020-01-08 | End: 2020-01-08 | Stop reason: HOSPADM

## 2020-01-08 RX ADMIN — METHOCARBAMOL TABLETS 500 MG: 500 TABLET, COATED ORAL at 06:32

## 2020-01-08 RX ADMIN — ACETAMINOPHEN 1000 MG: 500 TABLET ORAL at 06:32

## 2020-01-08 RX ADMIN — IBUPROFEN 600 MG: 600 TABLET, FILM COATED ORAL at 06:32

## 2020-01-08 NOTE — ED TRIAGE NOTES
Triage: Pt was going down steps previous evening and fell backwards onto left back. No LOC. Pt denies hitting head.

## 2020-01-08 NOTE — ED PROVIDER NOTES
42-year-old male presented to ER with left-sided thoracic back pain after having slip and fall yesterday evening at midnight. Patient reports that he was walking down the outside steps carrying the dog when his leg slipped falling backward onto the thoracic left lateral back. Patient denies any head trauma no loss of consciousness. Patient denies any shortness of breath. Patient has not taken any medications for the pain. Patient reports that when he had mild symptoms however when he woke up this morning he had worsened. Patient does not take any blood thinners. Reports that pain is worsened with palpation and movement. No numbness tingling weakness in the extremities. No saddle anesthesia or bowel or bladder incontinence.            Past Medical History:   Diagnosis Date    ACE-inhibitor cough     Depression     Diabetes (HCC)     HTN (hypertension)     Hypercholesterolemia     OCD (obsessive compulsive disorder)        Past Surgical History:   Procedure Laterality Date    HX COLONOSCOPY  2011         Family History:   Problem Relation Age of Onset   [facility] Cancer Mother         liver    Cancer Father         lung    Heart Disease Father     Hypertension Father     Diabetes Sister        Social History     Socioeconomic History    Marital status:      Spouse name: Not on file    Number of children: Not on file    Years of education: Not on file    Highest education level: Not on file   Occupational History    Not on file   Social Needs    Financial resource strain: Not on file    Food insecurity:     Worry: Not on file     Inability: Not on file    Transportation needs:     Medical: Not on file     Non-medical: Not on file   Tobacco Use    Smoking status: Never Smoker    Smokeless tobacco: Never Used   Substance and Sexual Activity    Alcohol use: No     Alcohol/week: 0.0 standard drinks    Drug use: No    Sexual activity: Yes     Partners: Female     Comment: homemaker   Lifestyle  Physical activity:     Days per week: Not on file     Minutes per session: Not on file    Stress: Not on file   Relationships    Social connections:     Talks on phone: Not on file     Gets together: Not on file     Attends Mandaeism service: Not on file     Active member of club or organization: Not on file     Attends meetings of clubs or organizations: Not on file     Relationship status: Not on file    Intimate partner violence:     Fear of current or ex partner: Not on file     Emotionally abused: Not on file     Physically abused: Not on file     Forced sexual activity: Not on file   Other Topics Concern    Not on file   Social History Narrative    Not on file         ALLERGIES: Patient has no known allergies. Review of Systems   Constitutional: Negative for chills and fever. HENT: Negative for congestion and sore throat. Eyes: Negative for pain. Respiratory: Negative for shortness of breath. Cardiovascular: Negative for chest pain. Gastrointestinal: Negative for abdominal pain, diarrhea, nausea and vomiting. Genitourinary: Negative for flank pain and hematuria. Musculoskeletal: Positive for back pain. Negative for neck pain. Skin: Negative for rash. Neurological: Negative for dizziness, seizures, speech difficulty, weakness, light-headedness, numbness and headaches. Vitals:    01/08/20 0614 01/08/20 0615   BP: 129/78 129/78   Pulse: 63    Resp: 12    Temp: 97.7 °F (36.5 °C)    SpO2: 99% 97%   Weight: 95.3 kg (210 lb)    Height: 6' (1.829 m)             Physical Exam  Constitutional:       Appearance: He is well-developed. HENT:      Head: Normocephalic. Eyes:      Conjunctiva/sclera: Conjunctivae normal.   Neck:      Musculoskeletal: Normal range of motion and neck supple. Cardiovascular:      Rate and Rhythm: Normal rate and regular rhythm. Pulmonary:      Effort: Pulmonary effort is normal. No respiratory distress. Breath sounds: Normal breath sounds. Abdominal:      General: Bowel sounds are normal.      Palpations: Abdomen is soft. Tenderness: There is no tenderness. Musculoskeletal: Normal range of motion. Thoracic back: He exhibits tenderness, swelling and spasm. He exhibits no bony tenderness. Back:       Comments: No midline back pain tenderness   Skin:     General: Skin is warm. Capillary Refill: Capillary refill takes less than 2 seconds. Findings: No rash. Neurological:      Mental Status: He is alert and oriented to person, place, and time. Sensory: Sensation is intact. Motor: Motor function is intact. Coordination: Coordination is intact. Gait: Gait is intact. Comments: No gross motor or sensory deficits          MDM  Number of Diagnoses or Management Options  Contusion of left side of back, initial encounter:   Fall, initial encounter:   Diagnosis management comments: Patient presenting ER status post fall and injury. X-ray imaging negative for any fractures. Patient has contusion on back. No respiratory distress. Discussed symptomatic treatment and discharge and follow-up with primary care provider.        Amount and/or Complexity of Data Reviewed  Tests in the radiology section of CPT®: reviewed           Procedures

## 2020-01-08 NOTE — ED NOTES
Dr. Marixa Domingo gave and reviewed discharge instructions with the patient and spouse. The patient and spouse verbalized understanding. The patient and spouse was given opportunity for questions. Patient discharged in stable condition to the waiting room via ambulatory with female visitor.

## 2022-03-18 PROBLEM — E66.811 OBESITY (BMI 30.0-34.9): Status: ACTIVE | Noted: 2018-05-14

## 2024-11-05 ENCOUNTER — HOSPITAL ENCOUNTER (EMERGENCY)
Facility: HOSPITAL | Age: 69
Discharge: HOME | End: 2024-11-05
Attending: EMERGENCY MEDICINE
Payer: MEDICARE

## 2024-11-05 VITALS
WEIGHT: 176.15 LBS | OXYGEN SATURATION: 94 % | HEIGHT: 71 IN | SYSTOLIC BLOOD PRESSURE: 139 MMHG | RESPIRATION RATE: 18 BRPM | HEART RATE: 83 BPM | BODY MASS INDEX: 24.66 KG/M2 | TEMPERATURE: 97.8 F | DIASTOLIC BLOOD PRESSURE: 62 MMHG

## 2024-11-05 DIAGNOSIS — T78.40XA ALLERGIC REACTION, INITIAL ENCOUNTER: Primary | ICD-10-CM

## 2024-11-05 LAB
COMMENT:: NORMAL
SPECIMEN HOLD: NORMAL

## 2024-11-05 PROCEDURE — 96372 THER/PROPH/DIAG INJ SC/IM: CPT

## 2024-11-05 PROCEDURE — 99284 EMERGENCY DEPT VISIT MOD MDM: CPT

## 2024-11-05 PROCEDURE — 96375 TX/PRO/DX INJ NEW DRUG ADDON: CPT

## 2024-11-05 PROCEDURE — 6360000002 HC RX W HCPCS: Performed by: EMERGENCY MEDICINE

## 2024-11-05 PROCEDURE — 96374 THER/PROPH/DIAG INJ IV PUSH: CPT

## 2024-11-05 RX ORDER — EPINEPHRINE 1 MG/ML
0.5 INJECTION, SOLUTION INTRAMUSCULAR; SUBCUTANEOUS
Status: DISCONTINUED | OUTPATIENT
Start: 2024-11-05 | End: 2024-11-05

## 2024-11-05 RX ORDER — DEXAMETHASONE SODIUM PHOSPHATE 10 MG/ML
10 INJECTION, SOLUTION INTRAMUSCULAR; INTRAVENOUS
Status: COMPLETED | OUTPATIENT
Start: 2024-11-05 | End: 2024-11-05

## 2024-11-05 RX ORDER — DIPHENHYDRAMINE HYDROCHLORIDE 50 MG/ML
50 INJECTION INTRAMUSCULAR; INTRAVENOUS
Status: COMPLETED | OUTPATIENT
Start: 2024-11-05 | End: 2024-11-05

## 2024-11-05 RX ORDER — EPINEPHRINE 0.3 MG/.3ML
0.3 INJECTION SUBCUTANEOUS ONCE
Qty: 0.3 ML | Refills: 0 | Status: SHIPPED | OUTPATIENT
Start: 2024-11-05 | End: 2024-11-05

## 2024-11-05 RX ORDER — EPINEPHRINE 1 MG/ML
0.5 INJECTION, SOLUTION, CONCENTRATE INTRAVENOUS ONCE
Status: COMPLETED | OUTPATIENT
Start: 2024-11-05 | End: 2024-11-05

## 2024-11-05 RX ADMIN — DEXAMETHASONE SODIUM PHOSPHATE 10 MG: 10 INJECTION, SOLUTION INTRAMUSCULAR; INTRAVENOUS at 20:32

## 2024-11-05 RX ADMIN — DIPHENHYDRAMINE HYDROCHLORIDE 50 MG: 50 INJECTION INTRAMUSCULAR; INTRAVENOUS at 20:31

## 2024-11-05 RX ADMIN — EPINEPHRINE 0.5 MG: 1 INJECTION, SOLUTION, CONCENTRATE INTRAVENOUS at 20:33

## 2024-11-05 ASSESSMENT — PAIN - FUNCTIONAL ASSESSMENT: PAIN_FUNCTIONAL_ASSESSMENT: 0-10

## 2024-11-05 ASSESSMENT — PAIN SCALES - GENERAL: PAINLEVEL_OUTOF10: 0

## 2024-11-06 NOTE — ED PROVIDER NOTES
[facility] EMERGENCY DEPT  EMERGENCY DEPARTMENT ENCOUNTER      Pt Name: Quinn Barker  MRN: [id number]  Birthdate 1955  Date of evaluation: 11/5/2024  Provider: Johnathan Castanon MD      HISTORY OF PRESENT ILLNESS      69-year-old male with history of hypertension, high cholesterol, diabetes presents to the emergency department his wife with concern for allergic reaction.  He was eating Chinese food about 30 minutes ago when he developed red flushed face, complaining of shortness of breath.  He reports previous history of allergic reaction many years ago.    The history is provided by the patient, medical records and the spouse.           Nursing Notes were reviewed.    REVIEW OF SYSTEMS         Review of Systems        PAST MEDICAL HISTORY     Past Medical History:   Diagnosis Date    ACE-inhibitor cough     Depression     Diabetes (HCC)     HTN (hypertension)     Hypercholesterolemia     OCD (obsessive compulsive disorder)          SURGICAL HISTORY       Past Surgical History:   Procedure Laterality Date    COLONOSCOPY  2011         CURRENT MEDICATIONS       Previous Medications    No medications on file       ALLERGIES     Patient has no allergy information on record.    FAMILY HISTORY       Family History   Problem Relation Age of Onset    Heart Disease Father     Diabetes Sister     Hypertension Father     Cancer Father         lung    Cancer Mother         liver          SOCIAL HISTORY       Social History     Socioeconomic History    Marital status:    Tobacco Use    Smoking status: Never    Smokeless tobacco: Never   Substance and Sexual Activity    Alcohol use: No     Alcohol/week: 0.0 standard drinks of alcohol    Drug use: No         PHYSICAL EXAM       ED Triage Vitals   BP Systolic BP Percentile Diastolic BP Percentile Temp Temp src Pulse Resp SpO2   -- -- -- -- -- -- -- --      Height Weight         -- --             There is no height or weight on file to calculate BMI.    Physical

## 2024-11-06 NOTE — ED TRIAGE NOTES
Pt had chinese food about an hour ago, thinks he might be having an allergic reaction to what he might have ate  Took 2 benadryl about 8pm  Endorses itchiness, redness, SOB   Denies difficulty swallowing or breathing